# Patient Record
Sex: FEMALE | URBAN - METROPOLITAN AREA
[De-identification: names, ages, dates, MRNs, and addresses within clinical notes are randomized per-mention and may not be internally consistent; named-entity substitution may affect disease eponyms.]

---

## 2017-02-06 ENCOUNTER — IMPORTED ENCOUNTER (OUTPATIENT)
Dept: URBAN - METROPOLITAN AREA CLINIC 1 | Facility: CLINIC | Age: 72
End: 2017-02-06

## 2017-02-06 PROBLEM — H25.813: Noted: 2017-02-06

## 2017-02-06 PROCEDURE — 92136 OPHTHALMIC BIOMETRY: CPT

## 2017-02-06 NOTE — PATIENT DISCUSSION
1. Cataract OU:  Visually Significant;  discussed the risks benefits alternatives and limitations of cataract surgery. The patient stated a full understanding and a desire to proceed with the procedure. Phaco/ PCL OS then OD. Recommend Standard lens standard technique. 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 Pt understands they will need glasses post-op to achieve their best vision.  *Consider Toric OD*

## 2017-02-15 ENCOUNTER — IMPORTED ENCOUNTER (OUTPATIENT)
Dept: URBAN - METROPOLITAN AREA CLINIC 1 | Facility: CLINIC | Age: 72
End: 2017-02-15

## 2017-02-16 ENCOUNTER — IMPORTED ENCOUNTER (OUTPATIENT)
Dept: URBAN - METROPOLITAN AREA CLINIC 1 | Facility: CLINIC | Age: 72
End: 2017-02-16

## 2017-02-16 PROBLEM — Z96.1: Noted: 2017-02-16

## 2017-02-16 PROCEDURE — 99024 POSTOP FOLLOW-UP VISIT: CPT

## 2017-02-16 NOTE — PATIENT DISCUSSION
POD#1 CE/IOL OS (Standard)  doing well. Continue all 3 gtts as prescribed and until gone. Use Lotemax BID OS Prolensa Qdaily OS Ocuflox TID OS Post op Warnings Reiterated RTC as scheduled

## 2017-02-21 ENCOUNTER — IMPORTED ENCOUNTER (OUTPATIENT)
Dept: URBAN - METROPOLITAN AREA CLINIC 1 | Facility: CLINIC | Age: 72
End: 2017-02-21

## 2017-02-21 PROBLEM — H25.811: Noted: 2017-02-21

## 2017-02-21 PROBLEM — Z96.1: Noted: 2017-02-21

## 2017-02-21 PROCEDURE — 92136 OPHTHALMIC BIOMETRY: CPT

## 2017-02-21 NOTE — PATIENT DISCUSSION
1.  Cataract OD: Visually Significant secondary to glare discussed the risks benefits alternatives and limitations of cataract surgery. The patient stated a full understanding and a desire to proceed with the procedure. Pt understands they will need glasses post-op to achieve their best vision for distance and near. Phaco PCL OD; standard lens standard technique. 2.  POW#2  CE/IOL OS (Standard)  doing well.   Use Lotemax BID OS till out Use Prolensa Qdaily OS till out

## 2017-03-01 ENCOUNTER — IMPORTED ENCOUNTER (OUTPATIENT)
Dept: URBAN - METROPOLITAN AREA CLINIC 1 | Facility: CLINIC | Age: 72
End: 2017-03-01

## 2017-03-02 ENCOUNTER — IMPORTED ENCOUNTER (OUTPATIENT)
Dept: URBAN - METROPOLITAN AREA CLINIC 1 | Facility: CLINIC | Age: 72
End: 2017-03-02

## 2017-03-02 PROBLEM — Z96.1: Noted: 2017-03-02

## 2017-03-02 PROCEDURE — 99024 POSTOP FOLLOW-UP VISIT: CPT

## 2017-03-02 NOTE — PATIENT DISCUSSION
1. POD#1 CE/IOL OD (Standard)  doing well. Continue all 3 gtts as prescribed and until gone. Use Lotemax BID OD Prolensa Qdaily OD Ocuflox TID OD Post op Warnings Reiterated 2. POW#2  CE/IOL OS (Standard)  doing well.   Use Use Lotemax BID OS till out Use Prolensa Qdaily OS till out F/u as scheduled

## 2017-04-18 ENCOUNTER — IMPORTED ENCOUNTER (OUTPATIENT)
Dept: URBAN - METROPOLITAN AREA CLINIC 1 | Facility: CLINIC | Age: 72
End: 2017-04-18

## 2017-04-18 PROBLEM — Z96.1: Noted: 2017-04-18

## 2017-04-18 PROCEDURE — 99024 POSTOP FOLLOW-UP VISIT: CPT

## 2017-04-18 NOTE — PATIENT DISCUSSION
POW#3 Phaco/ PCL OU (Standard) doing well Finish PO meds per schedule MRX for glasses given Or can use OTC readers 1.50-1.75Return for an appointment in 6 mo 30 OCT/ VF 24-2 OU with Dr. Janet Cruz.

## 2019-08-15 NOTE — PATIENT DISCUSSION
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 REFER TO DR. Lo Speaks FOR NEURO EVAL. NO OCULAR MOTILITY DISTURBANCE. RETURN FOR FOLLOW-UP AS SCHEDULED.

## 2019-08-15 NOTE — PATIENT DISCUSSION
PIGMENT DISPERSION SYNDROME, OS  :  INTRAOCULAR PRESSURE WITHIN ACCEPTABLE LIMITS. CONTINUE DROPS AS PRESCRIBED. IF IOP'S ELEVATES CONSIDER ADDITIONAL DROP OR SLT. RETURN FOR FOLLOW-UP AS SCHEDULED.

## 2020-02-06 NOTE — PATIENT DISCUSSION
CHOROIDAL NEVUS, OS: VERY SMALL AND STABLE. UV PROTECTION TO MINIMIZE UV EXPOSURE. PATIENT ELECTED TO RETURN AS SCHEDULED FOR EVALUATION AND PHOTO DOCUMENTATION WITH DR JULIAN.

## 2022-04-03 ASSESSMENT — TONOMETRY
OD_IOP_MMHG: 20
OS_IOP_MMHG: 15
OD_IOP_MMHG: 14
OD_IOP_MMHG: 18
OS_IOP_MMHG: 17
OS_IOP_MMHG: 19
OS_IOP_MMHG: 14
OS_IOP_MMHG: 16

## 2022-04-03 ASSESSMENT — VISUAL ACUITY
OD_SC: 20/25
OS_CC: 20/40
OS_SC: 20/100
OD_GLARE: 20/70
OD_CC: 20/30
OD_SC: 20/25-1
OS_GLARE: 20/400
OS_CC: 20/25
OD_CC: 20/25
OS_CC: J10
OD_GLARE: 20/70
OD_CC: J3
OS_CC: 20/25